# Patient Record
Sex: FEMALE | Race: WHITE | Employment: PART TIME | URBAN - METROPOLITAN AREA
[De-identification: names, ages, dates, MRNs, and addresses within clinical notes are randomized per-mention and may not be internally consistent; named-entity substitution may affect disease eponyms.]

---
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
## 2020-04-02 ENCOUNTER — HOSPITAL ENCOUNTER (OUTPATIENT)
Age: 20
Discharge: HOME | End: 2020-04-02
Attending: FAMILY MEDICINE
Payer: COMMERCIAL

## 2020-04-02 VITALS
HEART RATE: 91 BPM | OXYGEN SATURATION: 100 % | SYSTOLIC BLOOD PRESSURE: 143 MMHG | RESPIRATION RATE: 20 BRPM | TEMPERATURE: 98 F | DIASTOLIC BLOOD PRESSURE: 88 MMHG

## 2020-04-02 DIAGNOSIS — N30.00 ACUTE CYSTITIS WITHOUT HEMATURIA: Primary | ICD-10-CM

## 2020-04-02 PROCEDURE — 81025 URINE PREGNANCY TEST: CPT | Performed by: FAMILY MEDICINE

## 2020-04-02 PROCEDURE — 99214 OFFICE O/P EST MOD 30 MIN: CPT

## 2020-04-02 PROCEDURE — 87186 SC STD MICRODIL/AGAR DIL: CPT | Performed by: FAMILY MEDICINE

## 2020-04-02 PROCEDURE — 87086 URINE CULTURE/COLONY COUNT: CPT | Performed by: FAMILY MEDICINE

## 2020-04-02 PROCEDURE — 87088 URINE BACTERIA CULTURE: CPT | Performed by: FAMILY MEDICINE

## 2020-04-02 PROCEDURE — 81002 URINALYSIS NONAUTO W/O SCOPE: CPT | Performed by: FAMILY MEDICINE

## 2020-04-02 RX ORDER — CEFDINIR 300 MG/1
300 CAPSULE ORAL 2 TIMES DAILY
Qty: 14 CAPSULE | Refills: 0 | Status: SHIPPED | OUTPATIENT
Start: 2020-04-02 | End: 2020-04-09

## 2020-04-02 NOTE — ED INITIAL ASSESSMENT (HPI)
Patient states she had UTI symptoms on 3/18/20 and was treated via [facility] on 3/23/20 with Macrobid. States symptoms resolved, but returned today. C/O urgency, increased frequency and burning with urination. Did take AZO this morning.

## 2020-04-02 NOTE — ED PROVIDER NOTES
Patient presents with:  Dysuria    HPI:     Vinicio Mcneal is a 23year old female who presents with suspected symptoms of UTI  Onset of symptoms: 2  days  Complains of:  - Dysuria: yes   - Frequency and  urgency of urination: yes   - Hesitancy: yes   - S tenderness. . +suprapubic tenderness  Lungs: clear to auscultation bilaterally  Heart: S1, S2 normal, no murmur, click, rub or gallop, regular rate and rhythm  Abdomen: soft, non-tender; bowel sounds normal; no masses,  no organomegaly  Pulses: 2+ and symme culture ordered   Push fluids  Cranberry juice  Monitor for amount of bleeding in urine  Monitor urine output and fluid intake  Present to ED for any worsening abdominal pain, hematuria, difficulty urination, acute urinary retention, worsening flank pain,

## 2020-05-13 DIAGNOSIS — Z00.00 HEALTHY ADULT ON ROUTINE PHYSICAL EXAMINATION: ICD-10-CM

## 2020-05-13 RX ORDER — NORGESTIMATE AND ETHINYL ESTRADIOL 7DAYSX3 28
KIT ORAL
Qty: 84 TABLET | Refills: 1 | OUTPATIENT
Start: 2020-05-13

## 2020-05-13 NOTE — TELEPHONE ENCOUNTER
Please let patient or caregiver know or leave message that refill request for birth control has come from the pharmacy. Per prescribing guidelines, I need to show continuity and oversight of care for all prescriptions.    This is something we would typic

## 2020-05-13 NOTE — TELEPHONE ENCOUNTER
Gynecology Medication Protocol Failed5/13 9:19 AM   PASS-PENDING LAST PAP WNL--VIA MANUAL LOOKUP    Physical or Pelvic/Breast in past 12 or next 3 mos--VIA MANUAL LOOKUP     Last refill - 11/21/19 - #84 with 1 refill  Last well visit - 7/31/18

## 2020-06-01 ENCOUNTER — OFFICE VISIT (OUTPATIENT)
Dept: INTERNAL MEDICINE CLINIC | Facility: CLINIC | Age: 20
End: 2020-06-01
Payer: COMMERCIAL

## 2020-06-01 VITALS
WEIGHT: 277 LBS | HEART RATE: 76 BPM | BODY MASS INDEX: 39.65 KG/M2 | DIASTOLIC BLOOD PRESSURE: 78 MMHG | SYSTOLIC BLOOD PRESSURE: 118 MMHG | HEIGHT: 70 IN | RESPIRATION RATE: 16 BRPM

## 2020-06-01 DIAGNOSIS — Z78.9 VEGETARIAN DIET: ICD-10-CM

## 2020-06-01 DIAGNOSIS — F41.9 ANXIETY AND DEPRESSION: ICD-10-CM

## 2020-06-01 DIAGNOSIS — Z51.81 ENCOUNTER FOR THERAPEUTIC DRUG MONITORING: Primary | ICD-10-CM

## 2020-06-01 DIAGNOSIS — E66.01 SEVERE OBESITY (HCC): ICD-10-CM

## 2020-06-01 DIAGNOSIS — E66.9 OBESITY (BMI 30-39.9): ICD-10-CM

## 2020-06-01 DIAGNOSIS — F32.A ANXIETY AND DEPRESSION: ICD-10-CM

## 2020-06-01 PROCEDURE — 99204 OFFICE O/P NEW MOD 45 MIN: CPT | Performed by: NURSE PRACTITIONER

## 2020-06-01 RX ORDER — PHENTERMINE HYDROCHLORIDE 37.5 MG/1
37.5 TABLET ORAL EVERY MORNING
Qty: 30 TABLET | Refills: 0 | Status: SHIPPED | OUTPATIENT
Start: 2020-06-01 | End: 2020-07-17

## 2020-06-01 NOTE — PATIENT INSTRUCTIONS
Welcome to the [facility]...your Lifestyle Renovation begins now! Thank you for placing your trust in our health care team, I look forward to working with you along this journey to better health!     Next steps:     1.  Sched exercising to help establish a routine. If not already exercising begin with 1 day and progress as able with long-term goal of 30 minutes 5 days a week at a minimum. Meditation daily can help manage and control stress.  Chronic stress can make weight los

## 2020-06-01 NOTE — PROGRESS NOTES
HISTORY OF PRESENT ILLNESS  Patient presents with:  Weight Problem: found us online, no previous meds      Ricardo Wing is a 21year old female new to our office today with Mom for initiation of medical weight loss program.  Patient presents today with listening to music and sleeps.   Sleep hours and integrity: 7-8 hours/night, feels rested    MEDICAL HISTORY  PMH reviewed:   Cardiac disorders: negative  Depression/anxiety: anxiety>depression, reported as controlled at present  Glaucoma: negative  Kidney tenderness  MUSCULOSKELETAL: grossly intact  NEURO: Oriented times three, grossly intact  PSYCH: pleasant, cooperative, normal mood and affect    Lab Results   Component Value Date    WBC 6.4 01/25/2018    RBC 4.63 01/25/2018    HGB 13.6 01/25/2018    HCT instructions for dosing    Severe obesity ([address]  - start phentermine as directed- will monitor anxiety with tx. Consider alternative or adding topamax in the future for cravings.   - reviewed weight synopsis: 100# weight gain over approximately 5 years  - ONLY)        PLAN  · Medication use and side effects reviewed with patient. Medication contraindications: none. · Follow up with dietitian and psychologist as recommended. · Discussed the role of sleep and stress in weight management.   · Labs orders as Take 1 capsule daily with water. Please download doug MyFitness Pal or Prashant Schwartz! to monitor daily dietary intake and you will be able to see if you are eating the right amount of calories or too much or too little which would hinder weight loss.  Additional

## 2020-06-02 ENCOUNTER — TELEPHONE (OUTPATIENT)
Dept: INTERNAL MEDICINE CLINIC | Facility: CLINIC | Age: 20
End: 2020-06-02

## 2020-06-02 NOTE — TELEPHONE ENCOUNTER
Received request from Gustavo to complete PA for phentermine on CMM    Leigh Mensing (Key: [id number])    Awaiting decision

## 2020-06-18 ENCOUNTER — PATIENT MESSAGE (OUTPATIENT)
Dept: FAMILY MEDICINE CLINIC | Facility: CLINIC | Age: 20
End: 2020-06-18

## 2020-06-18 NOTE — TELEPHONE ENCOUNTER
From: Niel Goltz Mensing  To: Dee Dee Gale DO  Sent: 6/18/2020 5:02 PM CDT  Subject: Non-Urgent Medical Question    I am supposed to leave the beginning of August to go back to college.  Will flu vaccines be available in [place] before then or will I need to

## 2020-06-19 ENCOUNTER — TELEMEDICINE (OUTPATIENT)
Dept: FAMILY MEDICINE CLINIC | Facility: CLINIC | Age: 20
End: 2020-06-19

## 2020-06-19 DIAGNOSIS — Z30.019 ENCOUNTER FOR FEMALE BIRTH CONTROL: Primary | ICD-10-CM

## 2020-06-19 DIAGNOSIS — F41.9 ANXIETY AND DEPRESSION: ICD-10-CM

## 2020-06-19 DIAGNOSIS — F32.A ANXIETY AND DEPRESSION: ICD-10-CM

## 2020-06-19 DIAGNOSIS — E66.01 SEVERE OBESITY (HCC): ICD-10-CM

## 2020-06-19 DIAGNOSIS — Z30.41 ENCOUNTER FOR SURVEILLANCE OF CONTRACEPTIVE PILLS: ICD-10-CM

## 2020-06-19 PROCEDURE — 99214 OFFICE O/P EST MOD 30 MIN: CPT | Performed by: FAMILY MEDICINE

## 2020-06-19 RX ORDER — NORGESTIMATE AND ETHINYL ESTRADIOL 7DAYSX3 28
1 KIT ORAL DAILY
Qty: 84 TABLET | Refills: 3 | Status: SHIPPED | OUTPATIENT
Start: 2020-06-19 | End: 2021-05-15

## 2020-06-19 NOTE — PROGRESS NOTES
This is a telemedicine visit with live, interactive video and audio. Patient understands and accepts financial responsibility for any deductible, co-insurance and/or co-pays associated with this service.     SUBJECTIVE  First 2 yrs of college are over, Phentermine HCl 37.5 MG Oral Tab Take 1 tablet (37.5 mg total) by mouth every morning.  See discharge instructions for dosing 30 tablet 0   • glycopyrrolate 1 MG Oral Tab TK 1 T PO BID  1   • Propranolol HCl 10 MG Oral Tab TAKE 1 TABLET BY MOUTH DAILY AS NE

## 2020-07-14 DIAGNOSIS — Z51.81 ENCOUNTER FOR THERAPEUTIC DRUG MONITORING: ICD-10-CM

## 2020-07-14 DIAGNOSIS — E66.9 OBESITY (BMI 30-39.9): ICD-10-CM

## 2020-07-14 DIAGNOSIS — E66.01 SEVERE OBESITY (HCC): ICD-10-CM

## 2020-07-15 NOTE — TELEPHONE ENCOUNTER
Requesting Phentermine 37.5 mg  LOV: 6/1/20  RTC: one month  Last Relevant Labs: na  Filled: 6/1/20 #30 with 0 refills   [place]    Future Appointments   Date Time Provider Milena Collins   7/22/2020 11:00 AM Antonieta Manzano

## 2020-07-17 RX ORDER — PHENTERMINE HYDROCHLORIDE 37.5 MG/1
TABLET ORAL
Qty: 30 TABLET | Refills: 0 | Status: SHIPPED | OUTPATIENT
Start: 2020-07-17 | End: 2020-08-25

## 2020-07-22 ENCOUNTER — OFFICE VISIT (OUTPATIENT)
Dept: INTERNAL MEDICINE CLINIC | Facility: CLINIC | Age: 20
End: 2020-07-22
Payer: COMMERCIAL

## 2020-07-22 VITALS
BODY MASS INDEX: 38.94 KG/M2 | HEART RATE: 98 BPM | HEIGHT: 70 IN | SYSTOLIC BLOOD PRESSURE: 118 MMHG | WEIGHT: 272 LBS | RESPIRATION RATE: 16 BRPM | DIASTOLIC BLOOD PRESSURE: 80 MMHG | TEMPERATURE: 97 F

## 2020-07-22 DIAGNOSIS — Z51.81 ENCOUNTER FOR THERAPEUTIC DRUG MONITORING: Primary | ICD-10-CM

## 2020-07-22 DIAGNOSIS — E66.9 OBESITY (BMI 30-39.9): ICD-10-CM

## 2020-07-22 DIAGNOSIS — E66.01 SEVERE OBESITY (HCC): ICD-10-CM

## 2020-07-22 PROCEDURE — 99213 OFFICE O/P EST LOW 20 MIN: CPT | Performed by: NURSE PRACTITIONER

## 2020-07-22 PROCEDURE — 3074F SYST BP LT 130 MM HG: CPT | Performed by: NURSE PRACTITIONER

## 2020-07-22 PROCEDURE — 3008F BODY MASS INDEX DOCD: CPT | Performed by: NURSE PRACTITIONER

## 2020-07-22 PROCEDURE — 3079F DIAST BP 80-89 MM HG: CPT | Performed by: NURSE PRACTITIONER

## 2020-07-22 RX ORDER — PHENTERMINE HYDROCHLORIDE 37.5 MG/1
37.5 TABLET ORAL EVERY MORNING
Qty: 30 TABLET | Refills: 0 | Status: CANCELLED | OUTPATIENT
Start: 2020-07-22

## 2020-07-22 NOTE — PROGRESS NOTES
Celina Nicole is a 21year old female presents today for 1 month follow-up on medical weight loss program for the treatment of overweight, obesity, or morbid obesity.     S:  Current weight Wt Readings from Last 6 Encounters:  07/22/20 : 272 lb (123.4 kg) conjunctiva pink, sclera non icteric, PERRLA  LUNGS: CTA in all fields, breathing non labored  CARDIO: RRR without murmur, normal S1 and S2 without clicks or gallops, no pedal edema.   GI: +BS  NEURO/MS: motor and sensory grossly intact  PSYCH: pleasant, co MyPlate: Vegetables  Vegetables are a major source of fiber. They’re also packed with vitamins needed for health and growth. At mealtimes, make half your plate fruits and vegetables.   Nutrient-rich choices  Fresh, frozen, or canned—all vegetables are high build a life long habit. Often we want to eat but not for true hunger, rather it's triggered by emotional/physical or environmental reasons. Check out www. [employer] website for tools and tips as well as an doug for daily support.  Beyond these tools coun hunger? Think of alternatives to eating for when these temptations arise.  Some ideas are:  • Drink a glass of cold water or another zero-calorie beverage   • Take a walk to change the scenery   • Do another form of exercise (sit-ups, running, swimming, te emotional eating at a very young age. We get into the habit of using food to sooth stressful feelings, alleviate boredom, reward and comfort ourselves, boost our sprits and celebrate with others.   But even though almost everyone’s overeating, you don’t hav music, enjoy a warm bath, read a good book, watch a movie, work in the garden or talk to a friend. 11. Practice mindfulness. Mindful eating means paying attention to the act of eating and observing your thoughts and feelings in the process.   12. Get some

## 2020-07-22 NOTE — PATIENT INSTRUCTIONS
Continue making lifestyle changes that focus on good nutrition, regular exercise and stress management. Medication Plan: Continue current medication regimen, no refill needed at this time.     Next steps to work on before next office visit include:     1 vegetables yourself. Try fresh herbs, garlic, toasted almonds, or sesame seeds. · Canned vegetables often have lots of salt. Shop for low-sodium varieties.   One small change  Sneak vegetables into every meal. Shred carrots into hamburger, or add zucchini hunger. Think about how hungry you physically feel. Your goal is to eat between levels 4 and 6. This means you are eating when you are hungry but stopping when you are comfortably full. Try not to put off eating for too long.  Waiting until level 1 or 2 — eating and overeating can’t really satisfy an insatiable appetite anyway.   And whether or not Roniwiliam Cowaner been trying to use emotional eating to soothe feelings of stress, depression, loneliness, frustration or boredom, in the long run, overeating to feed those more high fiber foods, such as vegetables, beans, whole grains and fresh fruits, plus healthy high protein foods, like fish, lean poultry and low-fat dairy. 7. Eat mini-meals often.  By eating 5 or 6 small healthy meals a day, including breakfast, you help

## 2020-08-25 DIAGNOSIS — E66.01 SEVERE OBESITY (HCC): ICD-10-CM

## 2020-08-25 DIAGNOSIS — E66.9 OBESITY (BMI 30-39.9): ICD-10-CM

## 2020-08-25 DIAGNOSIS — Z51.81 ENCOUNTER FOR THERAPEUTIC DRUG MONITORING: ICD-10-CM

## 2020-08-25 NOTE — TELEPHONE ENCOUNTER
Requesting Phentermine 37.5 mg  LOV: 7/22/20  RTC: 4 months  Last Relevant Labs: na  Filled: 7/17/20 #30 with 0 refills  Last [place]17[place]20 [place]    No future appointments. Patient is back at school.   Pended

## 2020-08-26 RX ORDER — PHENTERMINE HYDROCHLORIDE 37.5 MG/1
37.5 TABLET ORAL EVERY MORNING
Qty: 30 TABLET | Refills: 2 | Status: SHIPPED | OUTPATIENT
Start: 2020-08-26 | End: 2021-06-17 | Stop reason: ALTCHOICE

## 2020-09-27 DIAGNOSIS — E66.01 SEVERE OBESITY (HCC): ICD-10-CM

## 2020-09-27 DIAGNOSIS — Z51.81 ENCOUNTER FOR THERAPEUTIC DRUG MONITORING: ICD-10-CM

## 2020-09-27 DIAGNOSIS — E66.9 OBESITY (BMI 30-39.9): ICD-10-CM

## 2020-09-27 RX ORDER — PHENTERMINE HYDROCHLORIDE 37.5 MG/1
37.5 TABLET ORAL EVERY MORNING
Qty: 30 TABLET | Refills: 2 | OUTPATIENT
Start: 2020-09-27

## 2020-09-27 NOTE — TELEPHONE ENCOUNTER
Requesting Phentermine 37.5 mg  LOV: 7/22/20  RTC: 4 months  Last Relevant Labs: na  Filled: 8/26/20 #30 with 2 refills    No future appointments. Receipt confirmed at pharmacy requesting - due in November.   Denied with note as such

## 2020-09-30 ENCOUNTER — TELEPHONE (OUTPATIENT)
Dept: INTERNAL MEDICINE CLINIC | Facility: CLINIC | Age: 20
End: 2020-09-30

## 2021-04-10 ENCOUNTER — TELEPHONE (OUTPATIENT)
Dept: FAMILY MEDICINE CLINIC | Facility: CLINIC | Age: 21
End: 2021-04-10

## 2021-05-14 ENCOUNTER — PATIENT MESSAGE (OUTPATIENT)
Dept: FAMILY MEDICINE CLINIC | Facility: CLINIC | Age: 21
End: 2021-05-14

## 2021-05-14 DIAGNOSIS — Z30.41 ENCOUNTER FOR SURVEILLANCE OF CONTRACEPTIVE PILLS: ICD-10-CM

## 2021-05-14 DIAGNOSIS — Z30.019 ENCOUNTER FOR FEMALE BIRTH CONTROL: ICD-10-CM

## 2021-05-14 RX ORDER — NORGESTIMATE AND ETHINYL ESTRADIOL 7DAYSX3 28
1 KIT ORAL DAILY
Qty: 84 TABLET | Refills: 3 | Status: CANCELLED | OUTPATIENT
Start: 2021-05-14

## 2021-05-14 NOTE — TELEPHONE ENCOUNTER
From: Edda Diaz Mensing  To: Servando Xiong DO  Sent: 5/14/2021 1:34 PM CDT  Subject: Prescription Question    Hi. I received the message about scheduling an appt for my birth control refill.  Can I get one more month refill to keep me current until I can get

## 2021-05-14 NOTE — TELEPHONE ENCOUNTER
Last OV 6/19/2020 Telemed  No pap  Last refilled 6/19/2020  #84  3 refills  Future Appointments   Date Time Provider Milena Collins   6/7/2021  2:30 PM Juan Mccoy MD [address]

## 2021-05-15 RX ORDER — NORGESTIMATE AND ETHINYL ESTRADIOL 7DAYSX3 28
1 KIT ORAL DAILY
Qty: 28 TABLET | Refills: 0 | Status: SHIPPED | OUTPATIENT
Start: 2021-05-15 | End: 2021-06-17

## 2021-06-17 ENCOUNTER — PATIENT MESSAGE (OUTPATIENT)
Dept: FAMILY MEDICINE CLINIC | Facility: CLINIC | Age: 21
End: 2021-06-17

## 2021-06-17 ENCOUNTER — OFFICE VISIT (OUTPATIENT)
Dept: FAMILY MEDICINE CLINIC | Facility: CLINIC | Age: 21
End: 2021-06-17
Payer: COMMERCIAL

## 2021-06-17 VITALS
OXYGEN SATURATION: 99 % | SYSTOLIC BLOOD PRESSURE: 120 MMHG | RESPIRATION RATE: 16 BRPM | HEIGHT: 70.5 IN | BODY MASS INDEX: 40.77 KG/M2 | DIASTOLIC BLOOD PRESSURE: 80 MMHG | TEMPERATURE: 99 F | HEART RATE: 94 BPM | WEIGHT: 288 LBS

## 2021-06-17 DIAGNOSIS — Z00.00 HEALTHY ADULT ON ROUTINE PHYSICAL EXAMINATION: Primary | ICD-10-CM

## 2021-06-17 DIAGNOSIS — Z23 NEED FOR VACCINATION: ICD-10-CM

## 2021-06-17 DIAGNOSIS — E66.01 CLASS 3 SEVERE OBESITY WITH BODY MASS INDEX (BMI) OF 40.0 TO 44.9 IN ADULT, UNSPECIFIED OBESITY TYPE, UNSPECIFIED WHETHER SERIOUS COMORBIDITY PRESENT (HCC): ICD-10-CM

## 2021-06-17 DIAGNOSIS — G43.709 CHRONIC MIGRAINE WITHOUT AURA WITHOUT STATUS MIGRAINOSUS, NOT INTRACTABLE: ICD-10-CM

## 2021-06-17 DIAGNOSIS — Z30.41 ENCOUNTER FOR SURVEILLANCE OF CONTRACEPTIVE PILLS: ICD-10-CM

## 2021-06-17 DIAGNOSIS — Z30.019 ENCOUNTER FOR FEMALE BIRTH CONTROL: ICD-10-CM

## 2021-06-17 PROCEDURE — 99395 PREV VISIT EST AGE 18-39: CPT | Performed by: FAMILY MEDICINE

## 2021-06-17 PROCEDURE — 90715 TDAP VACCINE 7 YRS/> IM: CPT | Performed by: FAMILY MEDICINE

## 2021-06-17 PROCEDURE — 82607 VITAMIN B-12: CPT | Performed by: FAMILY MEDICINE

## 2021-06-17 PROCEDURE — 80050 GENERAL HEALTH PANEL: CPT | Performed by: FAMILY MEDICINE

## 2021-06-17 PROCEDURE — 90471 IMMUNIZATION ADMIN: CPT | Performed by: FAMILY MEDICINE

## 2021-06-17 PROCEDURE — 3079F DIAST BP 80-89 MM HG: CPT | Performed by: FAMILY MEDICINE

## 2021-06-17 PROCEDURE — 80061 LIPID PANEL: CPT | Performed by: FAMILY MEDICINE

## 2021-06-17 PROCEDURE — 83036 HEMOGLOBIN GLYCOSYLATED A1C: CPT | Performed by: FAMILY MEDICINE

## 2021-06-17 PROCEDURE — 3074F SYST BP LT 130 MM HG: CPT | Performed by: FAMILY MEDICINE

## 2021-06-17 PROCEDURE — 3008F BODY MASS INDEX DOCD: CPT | Performed by: FAMILY MEDICINE

## 2021-06-17 PROCEDURE — 82306 VITAMIN D 25 HYDROXY: CPT | Performed by: FAMILY MEDICINE

## 2021-06-17 RX ORDER — PHENTERMINE HYDROCHLORIDE 37.5 MG/1
37.5 TABLET ORAL
Qty: 30 TABLET | Refills: 0 | Status: SHIPPED | OUTPATIENT
Start: 2021-06-17 | End: 2021-07-26

## 2021-06-17 RX ORDER — SUMATRIPTAN 25 MG/1
25 TABLET, FILM COATED ORAL EVERY 2 HOUR PRN
Qty: 9 TABLET | Refills: 0 | Status: SHIPPED | OUTPATIENT
Start: 2021-06-17 | End: 2021-08-02

## 2021-06-17 NOTE — PROGRESS NOTES
HPI:   Claude Nazario is a 24year old female who presents for a complete physical exam. Symptoms: denies discharge, itching, burning or dysuria, has skipped periods and had irregular periods this spring. . Patient complains of weight gain and headaches. Vaccine 9 Vida Im                          06/26/2017      IPV                   02/22/2001 05/24/2001 11/29/2001 07/01/2005      Influenza             10/22/2007  10/23/2008  09/22/2009                            10/28/2010 needed for Anxiety. 60 tablet 5   • Propranolol HCl 10 MG Oral Tab TAKE 1 TABLET BY MOUTH DAILY AS NEEDED ONE HOUR BEFORE AN ANXIETY PROVOKING EVENT.  30 tablet 2      Past Medical History:   Diagnosis Date   • Anxiety    • Depression    • Mono exposure normocephalic,ears and throat are clear  EYES:PERRLA, EOMI,  conjunctiva are clear  NECK: supple,no adenopathy,   CHEST: no chest tenderness  BREAST: not done   LUNGS: clear to auscultation  CARDIO: RRR without murmur  GI: good BS's,no masses, HSM or tende (89771) (DX V06.1/Z23)      VENIPUNCTURE      Meds & Refills for this Visit:  Requested Prescriptions     Signed Prescriptions Disp Refills   • Phentermine HCl 37.5 MG Oral Tab 30 tablet 0     Sig: Take 1 tablet (37.5 mg total) by mouth every morning befor

## 2021-06-18 ENCOUNTER — TELEPHONE (OUTPATIENT)
Dept: FAMILY MEDICINE CLINIC | Facility: CLINIC | Age: 21
End: 2021-06-18

## 2021-06-18 RX ORDER — NORGESTIMATE AND ETHINYL ESTRADIOL 7DAYSX3 28
1 KIT ORAL DAILY
Qty: 84 TABLET | Refills: 3 | Status: SHIPPED | OUTPATIENT
Start: 2021-06-18 | End: 2022-05-09

## 2021-06-18 NOTE — TELEPHONE ENCOUNTER
From: Darian Guzmán Mensing  To: Carolyn Saeed DO  Sent: 6/17/2021 7:10 PM CDT  Subject: Prescription Question    Good evening -- I checked my medications and I didn't see a new prescription for the birth control. I sent a refill request using the [facility] doug.

## 2021-07-26 ENCOUNTER — OFFICE VISIT (OUTPATIENT)
Dept: FAMILY MEDICINE CLINIC | Facility: CLINIC | Age: 21
End: 2021-07-26
Payer: COMMERCIAL

## 2021-07-26 VITALS
RESPIRATION RATE: 16 BRPM | TEMPERATURE: 98 F | HEART RATE: 82 BPM | DIASTOLIC BLOOD PRESSURE: 72 MMHG | BODY MASS INDEX: 40 KG/M2 | WEIGHT: 283.25 LBS | SYSTOLIC BLOOD PRESSURE: 110 MMHG

## 2021-07-26 DIAGNOSIS — E78.5 HYPERLIPIDEMIA, UNSPECIFIED HYPERLIPIDEMIA TYPE: ICD-10-CM

## 2021-07-26 DIAGNOSIS — Z12.4 ENCOUNTER FOR PAPANICOLAOU SMEAR FOR CERVICAL CANCER SCREENING: ICD-10-CM

## 2021-07-26 DIAGNOSIS — E66.01 CLASS 3 SEVERE OBESITY WITH BODY MASS INDEX (BMI) OF 40.0 TO 44.9 IN ADULT, UNSPECIFIED OBESITY TYPE, UNSPECIFIED WHETHER SERIOUS COMORBIDITY PRESENT (HCC): Primary | ICD-10-CM

## 2021-07-26 DIAGNOSIS — Z23 NEED FOR VACCINATION AGAINST HUMAN PAPILLOMAVIRUS: ICD-10-CM

## 2021-07-26 LAB
CHOLEST SMN-MCNC: 214 MG/DL (ref ?–200)
HDLC SERPL-MCNC: 43 MG/DL (ref 40–59)
LDLC SERPL CALC-MCNC: 138 MG/DL (ref ?–100)
NONHDLC SERPL-MCNC: 171 MG/DL (ref ?–130)
PATIENT FASTING Y/N/NP: YES
TRIGL SERPL-MCNC: 183 MG/DL (ref 30–149)
VLDLC SERPL CALC-MCNC: 34 MG/DL (ref 0–30)

## 2021-07-26 PROCEDURE — 88175 CYTOPATH C/V AUTO FLUID REDO: CPT | Performed by: FAMILY MEDICINE

## 2021-07-26 PROCEDURE — 87591 N.GONORRHOEAE DNA AMP PROB: CPT | Performed by: FAMILY MEDICINE

## 2021-07-26 PROCEDURE — 3074F SYST BP LT 130 MM HG: CPT | Performed by: FAMILY MEDICINE

## 2021-07-26 PROCEDURE — 80061 LIPID PANEL: CPT | Performed by: FAMILY MEDICINE

## 2021-07-26 PROCEDURE — 90471 IMMUNIZATION ADMIN: CPT | Performed by: FAMILY MEDICINE

## 2021-07-26 PROCEDURE — 3078F DIAST BP <80 MM HG: CPT | Performed by: FAMILY MEDICINE

## 2021-07-26 PROCEDURE — 87491 CHLMYD TRACH DNA AMP PROBE: CPT | Performed by: FAMILY MEDICINE

## 2021-07-26 PROCEDURE — 99214 OFFICE O/P EST MOD 30 MIN: CPT | Performed by: FAMILY MEDICINE

## 2021-07-26 PROCEDURE — 90651 9VHPV VACCINE 2/3 DOSE IM: CPT | Performed by: FAMILY MEDICINE

## 2021-07-26 RX ORDER — PHENTERMINE HYDROCHLORIDE 37.5 MG/1
TABLET ORAL
Qty: 30 TABLET | Refills: 0 | Status: SHIPPED | OUTPATIENT
Start: 2021-07-26 | End: 2021-09-24

## 2021-07-26 NOTE — PROGRESS NOTES
HPI:   Freddy Khalil is a 24year old female who presents for a complete physical exam. Symptoms: denies discharge, itching, burning or dysuria, periods are regular. Patient complains of nothing new. Doing okay with OCP, has been on that for 3 yrs.  Some lb (130.6 kg)  07/22/20 : 272 lb (123.4 kg)  06/01/20 : 277 lb (125.6 kg)  07/31/18 : 233 lb (105.7 kg) (99 %, Z= 2.32)*  05/31/18 : 229 lb 3.2 oz (104 kg) (99 %, Z= 2.29)*    * Growth percentiles are based on CDC (Girls, 2-20 Years) data.   Body mass index • TONSILLECTOMY        Family History   Problem Relation Age of Onset   • Diabetes Father    • Cancer Maternal Grandmother    • Heart Disease Maternal Grandmother    • Other (scoliosis) Maternal Grandmother    • Heart Disease Maternal Grandfather    • He town from school again. Call for refill when ready.      Orders Placed This Encounter      Lipid Panel      HPV (Gardasil 9)      ThinPrep PAP with HPV Reflex Request      ThinPrep PAP with HPV Reflex Request      Chlamydia/Gc Amplification [E]      Meds &

## 2021-07-27 LAB
C TRACH DNA SPEC QL NAA+PROBE: NEGATIVE
N GONORRHOEA DNA SPEC QL NAA+PROBE: NEGATIVE

## 2021-07-28 ENCOUNTER — TELEPHONE (OUTPATIENT)
Dept: FAMILY MEDICINE CLINIC | Facility: CLINIC | Age: 21
End: 2021-07-28

## 2021-07-28 NOTE — TELEPHONE ENCOUNTER
Fax from [facility]. Phentermine PA was denied. Per KE, patient will have to pay out of pocket. Patient notified via detailed voicemail left at cell number (ok per  HIPAA consent). Advised in message to look on [employer] for coupon

## 2021-08-02 DIAGNOSIS — G43.709 CHRONIC MIGRAINE WITHOUT AURA WITHOUT STATUS MIGRAINOSUS, NOT INTRACTABLE: ICD-10-CM

## 2021-08-02 RX ORDER — SUMATRIPTAN 25 MG/1
25 TABLET, FILM COATED ORAL EVERY 2 HOUR PRN
Qty: 9 TABLET | Refills: 0 | Status: SHIPPED | OUTPATIENT
Start: 2021-08-02

## 2021-09-24 ENCOUNTER — OFFICE VISIT (OUTPATIENT)
Dept: FAMILY MEDICINE CLINIC | Facility: CLINIC | Age: 21
End: 2021-09-24
Payer: COMMERCIAL

## 2021-09-24 VITALS
BODY MASS INDEX: 40.49 KG/M2 | TEMPERATURE: 97 F | WEIGHT: 286 LBS | HEART RATE: 79 BPM | HEIGHT: 70.5 IN | DIASTOLIC BLOOD PRESSURE: 70 MMHG | SYSTOLIC BLOOD PRESSURE: 116 MMHG | RESPIRATION RATE: 16 BRPM | OXYGEN SATURATION: 98 %

## 2021-09-24 DIAGNOSIS — Z51.81 MEDICATION MONITORING ENCOUNTER: ICD-10-CM

## 2021-09-24 DIAGNOSIS — J06.9 VIRAL URI: ICD-10-CM

## 2021-09-24 DIAGNOSIS — E66.01 CLASS 3 SEVERE OBESITY WITH BODY MASS INDEX (BMI) OF 40.0 TO 44.9 IN ADULT, UNSPECIFIED OBESITY TYPE, UNSPECIFIED WHETHER SERIOUS COMORBIDITY PRESENT (HCC): Primary | ICD-10-CM

## 2021-09-24 PROCEDURE — 3078F DIAST BP <80 MM HG: CPT | Performed by: FAMILY MEDICINE

## 2021-09-24 PROCEDURE — 3008F BODY MASS INDEX DOCD: CPT | Performed by: FAMILY MEDICINE

## 2021-09-24 PROCEDURE — 3074F SYST BP LT 130 MM HG: CPT | Performed by: FAMILY MEDICINE

## 2021-09-24 PROCEDURE — 99214 OFFICE O/P EST MOD 30 MIN: CPT | Performed by: FAMILY MEDICINE

## 2021-09-24 RX ORDER — PHENTERMINE HYDROCHLORIDE 37.5 MG/1
TABLET ORAL
Qty: 30 TABLET | Refills: 0 | Status: SHIPPED | OUTPATIENT
Start: 2021-09-24

## 2021-09-24 NOTE — PROGRESS NOTES
Janet Blunt is a 24year old female. Patient presents with: Follow - Up: on weight and phentermine      HPI:   Weight: struggling to remember to take the medication daily since being back at school. Taking it about 1/2 the days per week on average. Past Medical History:   Diagnosis Date   • Anxiety    • Depression    • Mono exposure    • Seasonal allergies       Social History:  Social History    Tobacco Use      Smoking status: Never Smoker      Smokeless tobacco: Never Used    Vaping Use      Vap visit:    Class 3 severe obesity with body mass index (BMI) of 40.0 to 44.9 in adult, unspecified obesity type, unspecified whether serious comorbidity present (HCC)  -     Phentermine HCl 37.5 MG Oral Tab; 1/2 to 1 tab daily  - will give it another month,

## 2022-05-09 RX ORDER — NORGESTIMATE AND ETHINYL ESTRADIOL 7DAYSX3 28
KIT ORAL
Qty: 84 TABLET | Refills: 1 | Status: SHIPPED | OUTPATIENT
Start: 2022-05-09

## 2022-10-12 DIAGNOSIS — Z30.019 ENCOUNTER FOR FEMALE BIRTH CONTROL: ICD-10-CM

## 2022-10-12 DIAGNOSIS — Z30.41 ENCOUNTER FOR SURVEILLANCE OF CONTRACEPTIVE PILLS: ICD-10-CM

## 2022-10-12 RX ORDER — NORGESTIMATE AND ETHINYL ESTRADIOL 7DAYSX3 28
1 KIT ORAL DAILY
Qty: 84 TABLET | Refills: 1 | Status: CANCELLED | OUTPATIENT
Start: 2022-10-12

## 2022-10-12 NOTE — TELEPHONE ENCOUNTER
Gynecology Medication Protocol Failed 10/12/2022 02:46 PM    Physical or Pelvic/Breast in past 12 or next 3 mos--VIA MANUAL LOOKUP    PASS-PENDING LAST PAP WNL--VIA MANUAL LOOKUP     LOV: 9/24/21  Last Refill: 5/9/22  #84 1 RF    Future Appointments   Date Time Provider Milena Collins   12/28/2022 11:00 AM MD Nelson Weldon

## 2022-10-13 NOTE — TELEPHONE ENCOUNTER
Patient notified via detailed voicemail left at cell number (ok per  HIPAA consent)  Asked patient to call office back to schedule.  Once scheduled medication can be sent

## 2022-11-26 ENCOUNTER — HOSPITAL ENCOUNTER (OUTPATIENT)
Age: 22
Discharge: HOME | End: 2022-11-26
Payer: COMMERCIAL

## 2022-11-26 VITALS
WEIGHT: 280 LBS | HEART RATE: 78 BPM | HEIGHT: 70.5 IN | TEMPERATURE: 97 F | OXYGEN SATURATION: 97 % | DIASTOLIC BLOOD PRESSURE: 82 MMHG | BODY MASS INDEX: 39.64 KG/M2 | RESPIRATION RATE: 20 BRPM | SYSTOLIC BLOOD PRESSURE: 122 MMHG

## 2022-11-26 DIAGNOSIS — J22 LOWER RESPIRATORY INFECTION: Primary | ICD-10-CM

## 2022-11-26 PROCEDURE — 99213 OFFICE O/P EST LOW 20 MIN: CPT | Performed by: NURSE PRACTITIONER

## 2022-11-26 RX ORDER — DOXYCYCLINE HYCLATE 100 MG/1
100 CAPSULE ORAL 2 TIMES DAILY
Qty: 14 CAPSULE | Refills: 0 | Status: SHIPPED | OUTPATIENT
Start: 2022-11-26 | End: 2022-12-03

## 2022-11-26 RX ORDER — PREDNISONE 10 MG/1
TABLET ORAL
Qty: 20 TABLET | Refills: 0 | Status: SHIPPED | OUTPATIENT
Start: 2022-11-26 | End: 2022-12-04

## 2022-11-26 RX ORDER — ALBUTEROL SULFATE 90 UG/1
AEROSOL, METERED RESPIRATORY (INHALATION)
COMMUNITY
Start: 2022-10-12

## 2023-03-04 ENCOUNTER — APPOINTMENT (OUTPATIENT)
Dept: GENERAL RADIOLOGY | Age: 23
End: 2023-03-04
Attending: NURSE PRACTITIONER
Payer: COMMERCIAL

## 2023-03-04 ENCOUNTER — HOSPITAL ENCOUNTER (OUTPATIENT)
Age: 23
Discharge: HOME | End: 2023-03-04
Payer: COMMERCIAL

## 2023-03-04 VITALS
SYSTOLIC BLOOD PRESSURE: 113 MMHG | DIASTOLIC BLOOD PRESSURE: 89 MMHG | RESPIRATION RATE: 18 BRPM | BODY MASS INDEX: 40.8 KG/M2 | TEMPERATURE: 98 F | OXYGEN SATURATION: 96 % | WEIGHT: 285 LBS | HEART RATE: 102 BPM | HEIGHT: 70 IN

## 2023-03-04 DIAGNOSIS — S93.401A MODERATE RIGHT ANKLE SPRAIN, INITIAL ENCOUNTER: Primary | ICD-10-CM

## 2023-03-04 PROCEDURE — 73610 X-RAY EXAM OF ANKLE: CPT | Performed by: NURSE PRACTITIONER

## 2023-03-04 PROCEDURE — A6449 LT COMPRES BAND >=3" <5"/YD: HCPCS | Performed by: NURSE PRACTITIONER

## 2023-03-04 PROCEDURE — 99213 OFFICE O/P EST LOW 20 MIN: CPT | Performed by: NURSE PRACTITIONER

## 2023-03-04 RX ORDER — IBUPROFEN 600 MG/1
600 TABLET ORAL ONCE
Status: COMPLETED | OUTPATIENT
Start: 2023-03-04 | End: 2023-03-04

## 2023-03-04 NOTE — ED INITIAL ASSESSMENT (HPI)
Pt sts twisted right ankle several weeks ago and then again last night while walking on uneven/inclined/[address]. Pain from ankle to calf. Unable to fully weight bear.

## 2023-08-01 ENCOUNTER — TELEPHONE (OUTPATIENT)
Dept: FAMILY MEDICINE CLINIC | Facility: CLINIC | Age: 23
End: 2023-08-01

## 2023-08-01 NOTE — TELEPHONE ENCOUNTER
Letter mailed to patient reminding her she is due for repeat testing per pt reminder:    Keerthi Kuhn, RN  Rigo Pfeiffer Nurse  Due for 2-3 year repeat pap

## 2023-09-06 ENCOUNTER — OFFICE VISIT (OUTPATIENT)
Dept: INTERNAL MEDICINE CLINIC | Facility: CLINIC | Age: 23
End: 2023-09-06
Payer: COMMERCIAL

## 2023-09-06 VITALS
WEIGHT: 293 LBS | HEART RATE: 95 BPM | DIASTOLIC BLOOD PRESSURE: 86 MMHG | RESPIRATION RATE: 16 BRPM | HEIGHT: 70 IN | OXYGEN SATURATION: 98 % | SYSTOLIC BLOOD PRESSURE: 122 MMHG | BODY MASS INDEX: 41.95 KG/M2

## 2023-09-06 DIAGNOSIS — F41.9 ANXIETY AND DEPRESSION: ICD-10-CM

## 2023-09-06 DIAGNOSIS — E88.81 INSULIN RESISTANCE: ICD-10-CM

## 2023-09-06 DIAGNOSIS — Z51.81 ENCOUNTER FOR THERAPEUTIC DRUG MONITORING: Primary | ICD-10-CM

## 2023-09-06 DIAGNOSIS — F32.A ANXIETY AND DEPRESSION: ICD-10-CM

## 2023-09-06 DIAGNOSIS — E66.01 CLASS 3 SEVERE OBESITY WITH SERIOUS COMORBIDITY AND BODY MASS INDEX (BMI) OF 45.0 TO 49.9 IN ADULT, UNSPECIFIED OBESITY TYPE (HCC): ICD-10-CM

## 2023-09-06 DIAGNOSIS — Z78.9 VEGETARIAN DIET: ICD-10-CM

## 2023-09-06 PROBLEM — E88.819 INSULIN RESISTANCE: Status: ACTIVE | Noted: 2023-09-06

## 2023-09-06 PROCEDURE — 3074F SYST BP LT 130 MM HG: CPT | Performed by: NURSE PRACTITIONER

## 2023-09-06 PROCEDURE — 99214 OFFICE O/P EST MOD 30 MIN: CPT | Performed by: NURSE PRACTITIONER

## 2023-09-06 PROCEDURE — 3079F DIAST BP 80-89 MM HG: CPT | Performed by: NURSE PRACTITIONER

## 2023-09-06 PROCEDURE — 3008F BODY MASS INDEX DOCD: CPT | Performed by: NURSE PRACTITIONER

## 2023-09-06 RX ORDER — NALTREXONE HYDROCHLORIDE 50 MG/1
25 TABLET, FILM COATED ORAL
Qty: 15 TABLET | Refills: 2 | Status: SHIPPED | OUTPATIENT
Start: 2023-09-06

## 2023-09-06 RX ORDER — BUPROPION HYDROCHLORIDE 150 MG/1
150 TABLET ORAL EVERY MORNING
Qty: 30 TABLET | Refills: 2 | Status: SHIPPED | OUTPATIENT
Start: 2023-09-06

## 2023-09-06 RX ORDER — METHYLPHENIDATE HYDROCHLORIDE 36 MG/1
TABLET ORAL
COMMUNITY
Start: 2023-07-28

## 2023-09-17 ENCOUNTER — HOSPITAL ENCOUNTER (OUTPATIENT)
Age: 23
Discharge: HOME | End: 2023-09-17
Payer: COMMERCIAL

## 2023-09-17 VITALS
HEART RATE: 86 BPM | RESPIRATION RATE: 18 BRPM | TEMPERATURE: 98 F | BODY MASS INDEX: 41.52 KG/M2 | SYSTOLIC BLOOD PRESSURE: 112 MMHG | DIASTOLIC BLOOD PRESSURE: 55 MMHG | OXYGEN SATURATION: 94 % | WEIGHT: 290 LBS | HEIGHT: 70 IN

## 2023-09-17 DIAGNOSIS — J06.9 UPPER RESPIRATORY INFECTION WITH COUGH AND CONGESTION: ICD-10-CM

## 2023-09-17 DIAGNOSIS — R05.1 ACUTE COUGH: Primary | ICD-10-CM

## 2023-09-17 DIAGNOSIS — R06.2 WHEEZING: ICD-10-CM

## 2023-09-17 LAB — SARS-COV-2 RNA RESP QL NAA+PROBE: NOT DETECTED

## 2023-09-17 RX ORDER — IPRATROPIUM BROMIDE AND ALBUTEROL SULFATE 2.5; .5 MG/3ML; MG/3ML
3 SOLUTION RESPIRATORY (INHALATION) ONCE
Status: COMPLETED | OUTPATIENT
Start: 2023-09-17 | End: 2023-09-17

## 2023-09-17 RX ORDER — PREDNISONE 20 MG/1
40 TABLET ORAL DAILY
Qty: 10 TABLET | Refills: 0 | Status: SHIPPED | OUTPATIENT
Start: 2023-09-18 | End: 2023-09-23

## 2023-09-17 RX ORDER — PREDNISONE 20 MG/1
60 TABLET ORAL ONCE
Status: COMPLETED | OUTPATIENT
Start: 2023-09-17 | End: 2023-09-17

## 2023-09-17 RX ORDER — ALBUTEROL SULFATE 90 UG/1
2 AEROSOL, METERED RESPIRATORY (INHALATION) EVERY 4 HOURS PRN
Qty: 1 EACH | Refills: 0 | Status: SHIPPED | OUTPATIENT
Start: 2023-09-17 | End: 2023-10-17

## 2024-09-16 NOTE — LETTER
2018      RE: Mitch Courser  : 2000      To Whom it May Concern,      Mitch Rodríguezr was seen in office 2018. Please excuse from school missed 2018.  She must not use her R upper extremity in PE or other extracurricular activit No

## 2025-07-12 NOTE — ED INITIAL ASSESSMENT (HPI)
Make sure you are washing everything with warm soapy water to get rid of the oils that can cause spreading of the rash.  Apply ointment to areas of rash.  You can trial doing this for several days but if you have any worsening of the spread then go ahead and start the prednisone taper.  Make sure you are washing her clothes and sheets regularly.   Pt dx with bronchitis about a month and a half ago. Symptoms resolved but cough returned and has been consistent for about a month now. No fever.

## (undated) DIAGNOSIS — Z30.41 ENCOUNTER FOR SURVEILLANCE OF CONTRACEPTIVE PILLS: ICD-10-CM

## (undated) DIAGNOSIS — Z30.019 ENCOUNTER FOR FEMALE BIRTH CONTROL: ICD-10-CM

## (undated) NOTE — Clinical Note
3/15/2017      RE: Nimisha Langley  : 2000      To Whom it May Concern,      Nimisha Langley was seen in office 3/15/2017 and may return to school.         Arabella Jj MD

## (undated) NOTE — LETTER
[facility] CARE IN [place]  [address] [address]  Dept: [phone]  Dept Fax: [phone]         December 12, 2017    Patient: Ricardo Wing   YOB: 2000   Date of Visit: 12/12/2017       To Whom It May Concern:

## (undated) NOTE — LETTER
[address] [address]  [phone]                5/29/2020        Gopal Alvarez [phone]      Dear Jazmin Lee.     To help

## (undated) NOTE — Clinical Note
New patient in need of dietary consult. [facility] August. [address] with limited veggies and does not like eggs.

## (undated) NOTE — ED AVS SNAPSHOT
THE [facility] in [address] [address]    Phone:  [phone]    Fax:  De Christirt Lemos   MRN: [id number]    Department:  THE [facility] in [place]   Date of Visit:  2/20/2017           Diag at [phone]. Si usted tiene algun problema con bryson sequimiento, por favor llame a nuestro adminstrador de casos al [phone].     Expect to receive an electronic request (by e-mail or text) to complete a self-assessment the day after your visi [facility] [address] ([address]) [address]  ([address]) [address] [facility]. ([address]) [address] [place] [address] 30. (8 COMPARISON:  [facility], XR CHEST PA + LAT CHEST (CPT=71020), 1/02/2017, 13:05.     TECHNIQUE:  PA and lateral chest radiographs were obtained. PATIENT STATED HISTORY:  Patient states she has had a dry cough for the past 2 months.

## (undated) NOTE — MR AVS SNAPSHOT
[address] [facility] [address]  [phone]               Thank you for choosing us for your health care visit with Oh Barraza MD.  We are glad to serve you and happy to provide you with this bryson [facility] in [facility] in [address] [facility], [facility] [address] [facility] [address]    [address]   [address]    [address]:  [address] [place] [address] Fluticasone Propionate 50 MCG/ACT Susp   Commonly known as:  FLONASE           Meloxicam 15 MG Tabs   Take 1 tablet (15 mg total) by mouth daily. Take with food.            ondansetron 4 MG Tbdp   Take 1 tablet (4 mg total) by mouth every 12 (twelve) hours

## (undated) NOTE — Clinical Note
Can you reach back out to [facility] to schedule a consult. She is leaving for college in 2 weeks. Thanks.

## (undated) NOTE — LETTER
[address] [phone]           Dear Miriam President records indicate that you are due for your well woman exam with Dr. Renae Webster. To provide you with the best possible care, please complete these orders at your earliest convenience. If you have recently completed these orders please disregard this letter. If you have any questions please call the office at [phone].      Thank you,     [facility]

## (undated) NOTE — LETTER
[facility] CARE IN [place]  [address] [address]  Dept: [phone]  Dept Fax: [phone]         January 2, 2017    Patient: Jo-Ann Brown   YOB: 2000   Date of Visit: 1/2/2017       To Whom It May Concern:    Gautam Toure

## (undated) NOTE — MR AVS SNAPSHOT
After Visit Summary   7/26/2021    Kevin Floresing    MRN: [id number]           Visit Information     Date & Time  7/26/2021 10:15 AM Provider  Bonny Edwards,  Department  [facility], Veronica [address] Dept.  Phone  [phone] Visit    CHLAMYDIA/GONOCOCCUS, ROBERT [[id number] CUSTOM]     HPV HUMAN PAPILLOMA VIRUS VACC 9 OSBALDO 3 DOSE IM [43657 CPT(R)]     LIPID PANEL [[id number] CUSTOM]     THINPREP PAP WITH HPV REFLEX REQUEST B [[id number] CUSTOM]     THINPREP PAP WITH HPV REFLEX REQUEST [LAB APPOINTMENTS   Available at primary care offices    AFTER HOURS CARE  [place]  OFFICE VISIT   Primary Care Providers  Treatment for mild illness or injury that does not require immediate attention.  Average cost  $70*   [facility]  Prateek Roberts

## (undated) NOTE — ED AVS SNAPSHOT
[facility] in [address] [address]    Phone:  [phone]    Fax:  [address]   MRN: [id number]    Department:  [facility] in [place]   Date of Visit:  1/2/2017           Diagn Or call [phone]    If you have any problems with your follow-up, please call our  at [phone]. Si usted tiene algun problema con bryson sequimiento, por favor llame a nuestro adminstrador de casos al [phone].     Expect t Carisa Chen [address] [facility] ([address]) [address] [facility] [address] ([address] [facility] [address]) [address]  ([address]) [address] [facility] PROCEDURE:  XR CHEST PA + LAT CHEST (CPT=71020)     INDICATIONS:  coughing     COMPARISON:  [facility] [address], XR CHEST PA + LAT CHEST (CPT=71020), 12/01/2015, 14:03.     TECHNIQUE:  PA and lateral chest radiographs were obtained.      PATIENT S

## (undated) NOTE — LETTER
[facility], [facility] NN[address] Allen [facility] [address]  [phone]                  7/25/18    Regarding:  Madonna Alcaraz - date of birth - [birth year]      To Whom it May Concern:    Please give Mary Kay Bonilla

## (undated) NOTE — LETTER
[address] [facility], [facility], [address], [facility], [address] [facility] [phone]            July 13, 2020      [address] [place], [place] [address]      Dear Ms. Moraima Tavarez